# Patient Record
Sex: MALE | Race: WHITE
[De-identification: names, ages, dates, MRNs, and addresses within clinical notes are randomized per-mention and may not be internally consistent; named-entity substitution may affect disease eponyms.]

---

## 2018-06-13 ENCOUNTER — HOSPITAL ENCOUNTER (EMERGENCY)
Dept: HOSPITAL 4 - SED | Age: 23
Discharge: HOME | End: 2018-06-13
Payer: COMMERCIAL

## 2018-06-13 VITALS — BODY MASS INDEX: 23.54 KG/M2 | HEIGHT: 67 IN | WEIGHT: 150 LBS

## 2018-06-13 VITALS — SYSTOLIC BLOOD PRESSURE: 150 MMHG

## 2018-06-13 VITALS — SYSTOLIC BLOOD PRESSURE: 134 MMHG

## 2018-06-13 DIAGNOSIS — N20.0: Primary | ICD-10-CM

## 2018-06-13 DIAGNOSIS — R03.0: ICD-10-CM

## 2018-06-13 DIAGNOSIS — N20.1: ICD-10-CM

## 2018-06-13 LAB
ALBUMIN SERPL BCP-MCNC: 4.5 G/DL (ref 3.4–4.8)
ALT SERPL W P-5'-P-CCNC: 30 U/L (ref 12–78)
ANION GAP SERPL CALCULATED.3IONS-SCNC: 9 MMOL/L (ref 5–15)
APPEARANCE UR: (no result)
AST SERPL W P-5'-P-CCNC: 15 U/L (ref 10–37)
BASOPHILS # BLD AUTO: 0.2 K/UL (ref 0–0.2)
BASOPHILS NFR BLD AUTO: 1.2 % (ref 0–2)
BILIRUB SERPL-MCNC: 0.6 MG/DL (ref 0–1)
BILIRUB UR QL STRIP: NEGATIVE
BUN SERPL-MCNC: 13 MG/DL (ref 8–21)
CALCIUM SERPL-MCNC: 9.2 MG/DL (ref 8.4–11)
CHLORIDE SERPL-SCNC: 103 MMOL/L (ref 98–107)
COLOR UR: YELLOW
CREAT SERPL-MCNC: 1.06 MG/DL (ref 0.55–1.3)
EOSINOPHIL # BLD AUTO: 0.1 K/UL (ref 0–0.4)
EOSINOPHIL NFR BLD AUTO: 0.6 % (ref 0–4)
ERYTHROCYTE [DISTWIDTH] IN BLOOD BY AUTOMATED COUNT: 11.9 % (ref 9–15)
GFR SERPL CREATININE-BSD FRML MDRD: 110 ML/MIN (ref 90–?)
GLUCOSE SERPL-MCNC: 125 MG/DL (ref 70–99)
GLUCOSE UR STRIP-MCNC: NEGATIVE MG/DL
HCT VFR BLD AUTO: 45.7 % (ref 36–54)
HGB BLD-MCNC: 15.3 G/DL (ref 14–18)
HGB UR QL STRIP: NEGATIVE
KETONES UR STRIP-MCNC: (no result) MG/DL
LEUKOCYTE ESTERASE UR QL STRIP: NEGATIVE
LIPASE SERPL-CCNC: 113 U/L (ref 73–393)
LYMPHOCYTES # BLD AUTO: 2.2 K/UL (ref 1–5.5)
LYMPHOCYTES NFR BLD AUTO: 16.1 % (ref 20.5–51.5)
MCH RBC QN AUTO: 30 PG (ref 27–31)
MCHC RBC AUTO-ENTMCNC: 34 % (ref 32–36)
MCV RBC AUTO: 89 FL (ref 79–98)
MONOCYTES # BLD MANUAL: 0.5 K/UL (ref 0–1)
MONOCYTES # BLD MANUAL: 3.8 % (ref 1.7–9.3)
NEUTROPHILS # BLD AUTO: 10.4 K/UL (ref 1.8–7.7)
NEUTROPHILS NFR BLD AUTO: 78.3 % (ref 40–70)
NITRITE UR QL STRIP: NEGATIVE
PH UR STRIP: 5.5 [PH] (ref 5–8)
PLATELET # BLD AUTO: 218 K/UL (ref 130–430)
POTASSIUM SERPL-SCNC: 3.7 MMOL/L (ref 3.5–5.1)
PROT UR QL STRIP: NEGATIVE
RBC # BLD AUTO: 5.13 MIL/UL (ref 4.2–6.2)
SODIUM SERPLBLD-SCNC: 140 MMOL/L (ref 136–145)
SP GR UR STRIP: >=1.03 (ref 1–1.03)
UROBILINOGEN UR STRIP-MCNC: 0.2 MG/DL (ref 0.2–1)
WBC # BLD AUTO: 13.4 K/UL (ref 4.8–10.8)

## 2018-06-13 PROCEDURE — 99285 EMERGENCY DEPT VISIT HI MDM: CPT

## 2018-06-13 PROCEDURE — 96375 TX/PRO/DX INJ NEW DRUG ADDON: CPT

## 2018-06-13 PROCEDURE — 74021 RADEX ABDOMEN 3+ VIEWS: CPT

## 2018-06-13 PROCEDURE — 36415 COLL VENOUS BLD VENIPUNCTURE: CPT

## 2018-06-13 PROCEDURE — 83690 ASSAY OF LIPASE: CPT

## 2018-06-13 PROCEDURE — 74176 CT ABD & PELVIS W/O CONTRAST: CPT

## 2018-06-13 PROCEDURE — 81003 URINALYSIS AUTO W/O SCOPE: CPT

## 2018-06-13 PROCEDURE — 80053 COMPREHEN METABOLIC PANEL: CPT

## 2018-06-13 PROCEDURE — 96374 THER/PROPH/DIAG INJ IV PUSH: CPT

## 2018-06-13 PROCEDURE — 85025 COMPLETE CBC W/AUTO DIFF WBC: CPT

## 2018-06-13 NOTE — NUR
Patient given written and verbal discharge instructions and verbalizes 
understanding.  ER MD discussed with patient the results and treatment 
provided. Patient in stable condition. ID arm band removed. IV catheter removed 
intact and dressing applied, no active bleeding.

Rx of Flomax and Norco given. Patient educated on pain management and to follow 
up with PMD. Pain Scale 0/10.

Opportunity for questions provided and answered. Medication side effect fact 
sheet provided.

## 2018-06-13 NOTE — NUR
Pt AAOx4 ambulated into ED c/o 10/10 L flank pain, urgency, frequency, and 
dysuria since 1800 today accompanying vomiting x 15 min ago while awaiting 
placement to bed. Pt deneis taking any medication for relief. Pt skin pale and 
dry. Guarding abdomen. Pt attempted multiple times to give urine sample with no 
success. No other injuries/complaints per pt/noted. Mother at bedsideWill 
continue to monitor.

## 2018-06-13 NOTE — NUR
Pt reports 7/10 pain to L flank. Dr. Neff notified. Per Dr. Neff, he would 
like to wait for CT results before ordering pain medication.